# Patient Record
Sex: FEMALE | Race: WHITE | NOT HISPANIC OR LATINO | URBAN - METROPOLITAN AREA
[De-identification: names, ages, dates, MRNs, and addresses within clinical notes are randomized per-mention and may not be internally consistent; named-entity substitution may affect disease eponyms.]

---

## 2024-08-22 ENCOUNTER — OFFICE VISIT (OUTPATIENT)
Dept: OBGYN | Facility: CLINIC | Age: 24
End: 2024-08-22
Payer: COMMERCIAL

## 2024-08-22 VITALS — DIASTOLIC BLOOD PRESSURE: 64 MMHG | SYSTOLIC BLOOD PRESSURE: 124 MMHG | WEIGHT: 171 LBS

## 2024-08-22 DIAGNOSIS — Z12.4 SCREENING FOR CERVICAL CANCER: ICD-10-CM

## 2024-08-22 DIAGNOSIS — Z11.3 SCREENING FOR STD (SEXUALLY TRANSMITTED DISEASE): ICD-10-CM

## 2024-08-22 DIAGNOSIS — Z11.8 SCREENING FOR CHLAMYDIAL DISEASE: ICD-10-CM

## 2024-08-22 DIAGNOSIS — Z30.41 ENCOUNTER FOR SURVEILLANCE OF CONTRACEPTIVE PILLS: ICD-10-CM

## 2024-08-22 DIAGNOSIS — N92.6 ABNORMAL BLEEDING IN MENSTRUAL CYCLE: Primary | ICD-10-CM

## 2024-08-22 PROCEDURE — G0145 SCR C/V CYTO,THINLAYER,RESCR: HCPCS

## 2024-08-22 PROCEDURE — 99459 PELVIC EXAMINATION: CPT

## 2024-08-22 PROCEDURE — 87591 N.GONORRHOEAE DNA AMP PROB: CPT

## 2024-08-22 PROCEDURE — 99203 OFFICE O/P NEW LOW 30 MIN: CPT

## 2024-08-22 PROCEDURE — 87491 CHLMYD TRACH DNA AMP PROBE: CPT

## 2024-08-22 RX ORDER — SERTRALINE HYDROCHLORIDE 100 MG/1
100 TABLET, FILM COATED ORAL DAILY
COMMUNITY
Start: 2024-01-30

## 2024-08-22 RX ORDER — LORAZEPAM 0.5 MG/1
TABLET ORAL
COMMUNITY
Start: 2023-07-28

## 2024-08-22 ASSESSMENT — ANXIETY QUESTIONNAIRES
GAD7 TOTAL SCORE: 11
8. IF YOU CHECKED OFF ANY PROBLEMS, HOW DIFFICULT HAVE THESE MADE IT FOR YOU TO DO YOUR WORK, TAKE CARE OF THINGS AT HOME, OR GET ALONG WITH OTHER PEOPLE?: SOMEWHAT DIFFICULT
3. WORRYING TOO MUCH ABOUT DIFFERENT THINGS: MORE THAN HALF THE DAYS
GAD7 TOTAL SCORE: 11
7. FEELING AFRAID AS IF SOMETHING AWFUL MIGHT HAPPEN: NOT AT ALL
6. BECOMING EASILY ANNOYED OR IRRITABLE: MORE THAN HALF THE DAYS
7. FEELING AFRAID AS IF SOMETHING AWFUL MIGHT HAPPEN: NOT AT ALL
2. NOT BEING ABLE TO STOP OR CONTROL WORRYING: MORE THAN HALF THE DAYS
5. BEING SO RESTLESS THAT IT IS HARD TO SIT STILL: SEVERAL DAYS
GAD7 TOTAL SCORE: 11
1. FEELING NERVOUS, ANXIOUS, OR ON EDGE: MORE THAN HALF THE DAYS
4. TROUBLE RELAXING: MORE THAN HALF THE DAYS
IF YOU CHECKED OFF ANY PROBLEMS ON THIS QUESTIONNAIRE, HOW DIFFICULT HAVE THESE PROBLEMS MADE IT FOR YOU TO DO YOUR WORK, TAKE CARE OF THINGS AT HOME, OR GET ALONG WITH OTHER PEOPLE: SOMEWHAT DIFFICULT

## 2024-08-22 ASSESSMENT — PATIENT HEALTH QUESTIONNAIRE - PHQ9
SUM OF ALL RESPONSES TO PHQ QUESTIONS 1-9: 9
10. IF YOU CHECKED OFF ANY PROBLEMS, HOW DIFFICULT HAVE THESE PROBLEMS MADE IT FOR YOU TO DO YOUR WORK, TAKE CARE OF THINGS AT HOME, OR GET ALONG WITH OTHER PEOPLE: SOMEWHAT DIFFICULT
SUM OF ALL RESPONSES TO PHQ QUESTIONS 1-9: 9

## 2024-08-22 NOTE — PATIENT INSTRUCTIONS
Thank you for visiting the [facility].    Today we discussed menstrual bleeding with IUD and birth control pills. A new prescription was sent to your pharmacy to attempt to switch back to Nortrel brand birth control pills. If your bleeding continues even after returning to your previous brand, we can consider removal and replacement of your Kyleena IUD.    Today we completed testing for STIs. You will receive your results via Cursogram along with treatment recommendations as soon as they are complete.      Please do not hesitate to contact the office if you have any questions or concerns.

## 2024-08-22 NOTE — PROGRESS NOTES
SUBJECTIVE:                                                   Devi Archer is a 23 year old female who presents to clinic today for the following health issue(s):  Patient presents with:  STD: Testing   Abnormal Bleeding Problem    HPI:  Deiv is a 24 yo female who presents for menstrual bleeding x 1 months after period of amenorrhea with Liletta IUD.     [facility] She has been amenorrheic following placement until this month. This month she experienced menstrual bleeding x 6 days with mild abdominal cramping, similar to her menstrual periods prior to IUD.     Patient is also using OCPs for acne, prescribed by prior OB/GYN at [facility]. She was started on Nortrel, but last month received Necon from her pharmacy.     No other changes in activity/diet, denies abdominal cramping/pain, no vaginal itching/discharge/odor.     She has been very happy with amenorrheic state, would like contraception that allows her to continue without periods if possible.     Patient's last menstrual period was 08/14/2024 (approximate)..     Patient is sexually active, No obstetric history on file..  Using IUD for contraception.    reports that she has never smoked. She has never used smokeless tobacco.    STD testing offered?  Accepted    Health maintenance updated:  yes  Care Gaps  Close care gaps  Overdue          Never done ADVANCE CARE PLANNING (Every 5 Years)     Never done HIV SCREENING (Once)     Never done HEPATITIS C SCREENING (Once)     AUG 15  2023 DTAP/TDAP/TD IMMUNIZATION (7 - Td or Tdap)   Last completed: Aug 15, 2013     SEP 1  2023 COVID-19 Vaccine (3 - 2023-24 season)  Last completed: May 14, 2021     Never done PHQ-2 (once per calendar year) (Yearly, January to December)     JUL 28 2024 YEARLY PREVENTIVE VISIT (Yearly)   Last completed: Jul 28, 2023         Due Soon          NOV 18 2024 PAP (Every 3 Years)   Last completed: Nov 18, 2021       Today's PHQ-2  Score:       8/22/2024     3:12 PM   PHQ-2 ( 1999 Pf[employer])   Q1: Little interest or pleasure in doing things 2   Q2: Feeling down, depressed or hopeless 1   PHQ-2 Score 3   Q1: Little interest or pleasure in doing things More than half the days   Q2: Feeling down, depressed or hopeless Several days   PHQ-2 Score 3     Today's PHQ-9 Score:       8/22/2024     3:12 PM   PHQ-9 SCORE   PHQ-9 Total Score MyChart 9 (Mild depression)   PHQ-9 Total Score 9     Today's LUIS ARMANDO-7 Score:       8/22/2024     3:12 PM   LUIS ARMANDO-7 SCORE   Total Score 11 (moderate anxiety)   Total Score 11       Problem list and histories reviewed & adjusted, as indicated.  Additional history: as documented.    There is no problem list on file for this patient.    History reviewed. No pertinent surgical history.   Social History     Tobacco Use    Smoking status: Never    Smokeless tobacco: Never   Substance Use Topics    Alcohol use: Yes     Comment: occ      No data available              Current Outpatient Medications   Medication Sig Dispense Refill    levonorgestrel (KYLEENA) 19.5 MG IUD 1 each by Intrauterine route once.      norethindrone-ethinyl estradiol (NECON) 0.5-35 MG-MCG tablet Take 1 tablet by mouth daily.      norethindrone-ethinyl estradiol (ORTHO-NOVUM) 1-35 MG-MCG tablet Take 1 tablet by mouth daily. 84 tablet 4    sertraline (ZOLOFT) 100 MG tablet Take 100 mg by mouth daily.      LORazepam (ATIVAN) 0.5 MG tablet Take 1-2 tablets as needed every 6 hours for anxiety (Patient not taking: Reported on 8/22/2024)       No current facility-administered medications for this visit.     No Known Allergies    OBJECTIVE:     /64   Wt 77.6 kg (171 lb)   LMP 08/14/2024 (Approximate)   There is no height or weight on file to calculate BMI.    Exam:  Constitutional:  Appearance: Well nourished, well developed alert, in no acute distress  Neurologic:  Mental Status:  Oriented X3.  Normal strength and tone, sensory exam grossly normal, mentation  intact and speech normal.    Psychiatric:  Mentation appears normal and affect normal/bright.  Pelvic Exam:  External Genitalia:     Normal appearance for age, no discharge present, no tenderness present, no inflammatory lesions present, color normal  Vagina:    Normal vaginal vault without central or paravaginal defects, no discharge present, no inflammatory lesions present, no masses present  Bladder:     Nontender to palpation  Urethra:   Urethral Body:  Urethra palpation normal, urethra structural support normal   Urethral Meatus:  No erythema or lesions present  Cervix:     Appearance healthy, no lesions present, nontender to palpation, no bleeding present, string present  Uterus:     Nontender to palpation, no masses present, position anteflexed, mobility: normal  Adnexa:     No adnexal tenderness present, no adnexal masses present  Perineum:     Perineum within normal limits, no evidence of trauma, no rashes or skin lesions present  Anus:     Anus within normal limits, no hemorrhoids present  Inguinal Lymph Nodes:     No lymphadenopathy present  Pubic Hair:     Normal pubic hair distribution for age  Genitalia and Groin:     No rashes present, no lesions present, no areas of discoloration, no masses present     In-Clinic Test Results:  No results found for this or any previous visit (from the past 24 hour(s)).    ASSESSMENT/PLAN:                                                        ICD-10-CM    1. Abnormal bleeding in menstrual cycle  N92.6       2. Screening for chlamydial disease  Z11.8 Chlamydia & Gonorrhea by PCR, [facility] - Clinic Collect      3. Screening for STD (sexually transmitted disease)  Z11.3 Chlamydia & Gonorrhea by PCR, [facility] - Clinic Collect      4. Screening for cervical cancer  Z12.4 Pap Screen Only - Recommended Age 21 - 24 Years      5. Encounter for surveillance of contraceptive pills  Z30.41 norethindrone-ethinyl estradiol (ORTHO-NOVUM) 1-35 MG-MCG tablet          Patient  Instructions   Thank you for visiting the [facility].    Today we discussed menstrual bleeding with IUD and birth control pills. A new prescription was sent to your pharmacy to attempt to switch back to Nortrel brand birth control pills. If your bleeding continues even after returning to your previous brand, we can consider removal and replacement of your Kyleena IUD.    Today we completed testing for STIs. You will receive your results via nexTune along with treatment recommendations as soon as they are complete.      Please do not hesitate to contact the office if you have any questions or concerns.     Devi is a 24 yo female who presents for menstrual bleeding x 1 months after period of amenorrhea with Liletta IUD.     - IUD strings visualized on exam, appears to be in proper position  - discussed possible bleeding secondary to change in brand of OCPs (despite same formulation), will attempt to return to Nortrel brand, Rx sent to pharmacy   - discussed possible bleeding secondary to length of time after placement of Liletta IUD, effective for cycle control for 5 years only, patient could complete removal and replacement with new IUD if desired  - patient prefers to trial return to Nortrel OCPs for a few months, if continues to have menstrual bleeding will return to discuss IUD removal/replacement    Moira Stern PA-C  [facility]    Answers submitted by the patient for this visit:  Patient Health Questionnaire (Submitted on 8/22/2024)  If you checked off any problems, how difficult have these problems made it for you to do your work, take care of things at home, or get along with other people?: Somewhat difficult  PHQ9 TOTAL SCORE: 9  Patient Health Questionnaire (G7) (Submitted on 8/22/2024)  LUIS ARMANDO 7 TOTAL SCORE: 11

## 2024-08-23 LAB
C TRACH DNA SPEC QL PROBE+SIG AMP: NEGATIVE
N GONORRHOEA DNA SPEC QL NAA+PROBE: NEGATIVE

## 2024-08-29 LAB
BKR LAB AP GYN ADEQUACY: NORMAL
BKR LAB AP GYN INTERPRETATION: NORMAL
BKR LAB AP HPV REFLEX: NO
BKR LAB AP PREVIOUS ABNORMAL: NORMAL
PATH REPORT.COMMENTS IMP SPEC: NORMAL
PATH REPORT.COMMENTS IMP SPEC: NORMAL
PATH REPORT.RELEVANT HX SPEC: NORMAL

## 2024-10-06 ENCOUNTER — E-VISIT (OUTPATIENT)
Dept: URGENT CARE | Facility: CLINIC | Age: 24
End: 2024-10-06
Payer: COMMERCIAL

## 2024-10-06 ENCOUNTER — HEALTH MAINTENANCE LETTER (OUTPATIENT)
Age: 24
End: 2024-10-06

## 2024-10-06 DIAGNOSIS — R21 RASH: Primary | ICD-10-CM

## 2024-10-06 PROCEDURE — 99207 PR NON-BILLABLE SERV PER CHARTING: CPT | Performed by: NURSE PRACTITIONER

## 2024-10-06 NOTE — PATIENT INSTRUCTIONS
Dear Devi Archer,    We are sorry you are not feeling well. Based on the responses you provided, it is recommended that you be seen in-person in urgent care so we can better evaluate your symptoms. Please click here to find the nearest urgent care location to you.   You will not be charged for this Visit. Thank you for trusting us with your care.    IVY GUY CNP

## 2024-10-08 ENCOUNTER — OFFICE VISIT (OUTPATIENT)
Dept: FAMILY MEDICINE | Facility: CLINIC | Age: 24
End: 2024-10-08
Payer: COMMERCIAL

## 2024-10-08 VITALS
TEMPERATURE: 98.2 F | WEIGHT: 174 LBS | DIASTOLIC BLOOD PRESSURE: 68 MMHG | BODY MASS INDEX: 27.97 KG/M2 | SYSTOLIC BLOOD PRESSURE: 120 MMHG | OXYGEN SATURATION: 98 % | RESPIRATION RATE: 16 BRPM | HEART RATE: 94 BPM | HEIGHT: 66 IN

## 2024-10-08 DIAGNOSIS — Z23 NEED FOR INFLUENZA VACCINATION: ICD-10-CM

## 2024-10-08 DIAGNOSIS — L30.9 DERMATITIS: Primary | ICD-10-CM

## 2024-10-08 DIAGNOSIS — Z23 NEED FOR TETANUS BOOSTER: ICD-10-CM

## 2024-10-08 PROCEDURE — 90472 IMMUNIZATION ADMIN EACH ADD: CPT | Performed by: NURSE PRACTITIONER

## 2024-10-08 PROCEDURE — 90656 IIV3 VACC NO PRSV 0.5 ML IM: CPT | Performed by: NURSE PRACTITIONER

## 2024-10-08 PROCEDURE — 99203 OFFICE O/P NEW LOW 30 MIN: CPT | Mod: 25 | Performed by: NURSE PRACTITIONER

## 2024-10-08 PROCEDURE — 90715 TDAP VACCINE 7 YRS/> IM: CPT | Performed by: NURSE PRACTITIONER

## 2024-10-08 PROCEDURE — 90471 IMMUNIZATION ADMIN: CPT | Performed by: NURSE PRACTITIONER

## 2024-10-08 RX ORDER — TRIAMCINOLONE ACETONIDE 1 MG/G
OINTMENT TOPICAL 2 TIMES DAILY
Qty: 15 G | Refills: 0 | Status: SHIPPED | OUTPATIENT
Start: 2024-10-08 | End: 2024-10-15

## 2024-10-08 NOTE — PROGRESS NOTES
"  Assessment & Plan     Devi was seen today for derm problem.    Diagnoses and all orders for this visit:    Dermatitis  Rash on left 4th and 5th digits and lower wrist. Instructed patient to apply triamcinolone ointment with aquaphor to rash twice daily for up to 2 weeks. Also, recommend taking Claritin or Zyrtec for histamine control.       -     triamcinolone (KENALOG) 0.1 % external ointment; Apply topically 2 times daily for 7 days.    Need for influenza vaccination  -     INFLUENZA VACCINE,SPLIT VIRUS,TRIVALENT,PF(FLUZONE)    Need for tetanus booster  -     TDAP 10-64Y (ADACEL,BOOSTRIX)    BMI  Estimated body mass index is 28.51 kg/m  as calculated from the following:    Height as of this encounter: 1.664 m (5' 5.5\").    Weight as of this encounter: 78.9 kg (174 lb).     Return in about 2 weeks (around 10/22/2024) for No improvement or sooner with worsening symptoms.      Subjective   Devi is a 23 year old, presenting for the following health issues:  Derm Problem (Rashes )        10/8/2024     9:44 AM   Additional Questions   Roomed by Maribel LECHUGA CMA     History of Present Illness       Reason for visit:  2 rash spots that havent gone away.  Symptom onset:  3-7 days ago  Had these symptoms before:  No   She is taking medications regularly.     Rash  Onset/Duration: one on pinky finger that started a week ago-one on arm that started about 4 days ago   Description  Location: left pinky finger and left arm.   Character: blotchy, raised, red  Itching: moderate-when it was more raised it was itchy-not so much anymore.   Intensity:  moderate  Progression of Symptoms:  same  Accompanying signs and symptoms:   Fever: No  Body aches or joint pain: No  Sore throat symptoms: No  Recent cold symptoms: No  History:           Previous episodes of similar rash: None  New exposures:  None and has been doing own gel polish at home-doesn't recall getting it on her skin.   Recent travel: No  Exposure to similar rash: " "No  Precipitating or alleviating factors: None  Therapies tried and outcome: Aquaphor and tea tree oil-has not helped.     HPI: Small rash located on lateral side of left 4th and 5th digits and left ventral side of wrist. Patient states the rash started about a week ago and reports new spots have been popping up since. States the rash was itchy at first but it has since subsided. States the bumps feel hard and are not blister-like. Denies pain. Reports doing gel nails at home recently but denies using new lotions, gardening, and environmental exposures. Reports getting hives typically in the colder weather (hx of chronic idiopathic urticaria), sees an allergist.     Review of Systems  INTEGUMENTARY/SKIN: POSITIVE for rash fingers left and generalized.      Objective    /68   Pulse 94   Temp 98.2  F (36.8  C) (Tympanic)   Resp 16   Ht 1.664 m (5' 5.5\")   Wt 78.9 kg (174 lb)   LMP 08/14/2024 (Approximate)   SpO2 98%   BMI 28.51 kg/m    Body mass index is 28.51 kg/m .    Physical Exam   GENERAL: alert and no distress  SKIN: Left lateral 4th and 5th digits with erythematous patch and left lower wrist forearm with minimal erythematous papular rash.           Signed Electronically by: IVY Vasquez CNP    "

## 2025-01-18 ENCOUNTER — MYC REFILL (OUTPATIENT)
Dept: OBGYN | Facility: CLINIC | Age: 25
End: 2025-01-18

## 2025-01-18 DIAGNOSIS — Z30.41 ENCOUNTER FOR SURVEILLANCE OF CONTRACEPTIVE PILLS: ICD-10-CM

## 2025-01-20 NOTE — TELEPHONE ENCOUNTER
Requested Prescriptions   Pending Prescriptions Disp Refills    norethindrone-ethinyl estradiol (ORTHO-NOVUM) 1-35 MG-MCG tablet 84 tablet 4     Sig: Take 1 tablet by mouth daily.       Contraceptives Protocol Passed - 1/20/2025  8:32 AM        Passed - Patient is not a current smoker if age is 35 or older        Passed - Medication is active on med list        Passed - Recent (12 mo) or future (90 days) visit within the authorizing provider's specialty     The patient must have completed an in-person or virtual visit within the past 12 months or has a future visit scheduled within the next 90 days with the authorizing provider s specialty.  Urgent care and e-visits do not qualify as an office visit for this protocol.          Passed - Medication indicated for associated diagnosis     Medication is associated with one or more of the following diagnoses:  Contraception  Acne  Dysmenorrhea  Menorrhagia  Amenorrhea  PCOS  Premenstrual Dysphoric Disorder  Irregular menses  Endometriosis  Contraceptive counseling  Finding of menstrual bleeding  Education about oral contraception  Uses contraception  Initial prescription of oral contraception  Oral contraception-no problem  Oral contraceptive repeat          Passed - No active pregnancy on record        Passed - No positive pregnancy test in past 12 months           Last Written Prescription Date:  8/22/24  Last Fill Quantity: 84,  # refills: 4   Last office visit: 8/22/2024 ; last virtual visit: Visit date not found with prescribing provider:  Moira Stern PA-C   Future Office Visit:      Rx denied, should have refills available    Mar Lala RN on 1/20/2025 at 8:33 AM  [facility] Triage

## 2025-03-29 ENCOUNTER — VIRTUAL VISIT (OUTPATIENT)
Dept: URGENT CARE | Facility: CLINIC | Age: 25
End: 2025-03-29
Payer: COMMERCIAL

## 2025-03-29 DIAGNOSIS — F41.1 GAD (GENERALIZED ANXIETY DISORDER): Primary | ICD-10-CM

## 2025-03-29 PROCEDURE — 98001 SYNCH AUDIO-VIDEO NEW LOW 30: CPT

## 2025-03-29 RX ORDER — SERTRALINE HYDROCHLORIDE 100 MG/1
100 TABLET, FILM COATED ORAL DAILY
Qty: 10 TABLET | Refills: 0 | Status: SHIPPED | OUTPATIENT
Start: 2025-03-29

## 2025-03-30 NOTE — PROGRESS NOTES
"Devi is a 24 year old who is being evaluated via a billable video visit.          Assessment & Plan     LUIS ARMANDO (generalized anxiety disorder)    - sertraline (ZOLOFT) 100 MG tablet; Take 1 tablet (100 mg) by mouth daily.    Will reach out to her psychiatrist Monday morning for a new prescription.      BMI  Estimated body mass index is 28.84 kg/m  as calculated from the following:    Height as of 2/7/25: 1.664 m (5' 5.5\").    Weight as of 2/7/25: 79.8 kg (176 lb).         Return in about 3 days (around 4/1/2025), or if symptoms worsen or fail to improve.    Subjective   Devi is a 24 year old, presenting for the following health issues:  No chief complaint on file.      Video Start Time:  2010    HPI    Was diagnosed with anxiety and panic attacks almost a year ago by her psychiatrist and was placed on sertraline.  She has been taking this daily as directed but ran out of it a week ago and now is having symptoms of nausea, dizziness and generally not feeling well.  She tried calling her psychiatrist but have not gotten back to her.  Last time she was in to see her psychiatrist was 3 months ago.          Review of Systems  Constitutional, HEENT, cardiovascular, pulmonary, gi and gu systems are negative, except as otherwise noted.      Objective           Vitals:  No vitals were obtained today due to virtual visit.    Physical Exam   GENERAL: alert and no distress  RESP: No audible wheeze, cough, or visible cyanosis.    NEURO: Cranial nerves grossly intact.  Mentation and speech appropriate for age.  PSYCH: Appropriate affect, tone, and pace of words          Video-Visit Details    Type of service:  Video Visit   Video End Time:8:21 PM  Originating Location (pt. Location): Home    Distant Location (provider location):  Off-site  Platform used for Video Visit: Maricel  Signed Electronically by: [facility]    "